# Patient Record
Sex: MALE | Race: OTHER | HISPANIC OR LATINO | URBAN - METROPOLITAN AREA
[De-identification: names, ages, dates, MRNs, and addresses within clinical notes are randomized per-mention and may not be internally consistent; named-entity substitution may affect disease eponyms.]

---

## 2018-12-07 ENCOUNTER — EMERGENCY (EMERGENCY)
Facility: HOSPITAL | Age: 28
LOS: 0 days | Discharge: HOME | End: 2018-12-07
Attending: EMERGENCY MEDICINE | Admitting: EMERGENCY MEDICINE

## 2018-12-07 VITALS
DIASTOLIC BLOOD PRESSURE: 69 MMHG | HEIGHT: 70 IN | TEMPERATURE: 98 F | RESPIRATION RATE: 18 BRPM | HEART RATE: 95 BPM | WEIGHT: 289.91 LBS | OXYGEN SATURATION: 99 % | SYSTOLIC BLOOD PRESSURE: 150 MMHG

## 2018-12-07 VITALS
RESPIRATION RATE: 20 BRPM | HEART RATE: 84 BPM | SYSTOLIC BLOOD PRESSURE: 148 MMHG | OXYGEN SATURATION: 100 % | TEMPERATURE: 97 F | DIASTOLIC BLOOD PRESSURE: 77 MMHG

## 2018-12-07 DIAGNOSIS — M25.561 PAIN IN RIGHT KNEE: ICD-10-CM
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
## 2018-12-07 DIAGNOSIS — Z79.899 OTHER LONG TERM (CURRENT) DRUG THERAPY: ICD-10-CM

## 2018-12-07 DIAGNOSIS — M54.5 LOW BACK PAIN: ICD-10-CM

## 2018-12-07 DIAGNOSIS — S89.91XA UNSPECIFIED INJURY OF RIGHT LOWER LEG, INITIAL ENCOUNTER: ICD-10-CM
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
## 2018-12-07 DIAGNOSIS — R10.33 PERIUMBILICAL PAIN: ICD-10-CM
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
## 2018-12-07 LAB
ALBUMIN SERPL ELPH-MCNC: 4.7 G/DL — SIGNIFICANT CHANGE UP (ref 3.5–5.2)
ALP SERPL-CCNC: 104 U/L — SIGNIFICANT CHANGE UP (ref 30–115)
ALT FLD-CCNC: 36 U/L — SIGNIFICANT CHANGE UP (ref 0–41)
ANION GAP SERPL CALC-SCNC: 16 MMOL/L — HIGH (ref 7–14)
AST SERPL-CCNC: 20 U/L — SIGNIFICANT CHANGE UP (ref 0–41)
BASOPHILS # BLD AUTO: 0.06 K/UL — SIGNIFICANT CHANGE UP (ref 0–0.2)
BASOPHILS NFR BLD AUTO: 0.6 % — SIGNIFICANT CHANGE UP (ref 0–1)
BILIRUB SERPL-MCNC: 0.3 MG/DL — SIGNIFICANT CHANGE UP (ref 0.2–1.2)
BUN SERPL-MCNC: 36 MG/DL — HIGH (ref 10–20)
CALCIUM SERPL-MCNC: 9.7 MG/DL — SIGNIFICANT CHANGE UP (ref 8.5–10.1)
CHLORIDE SERPL-SCNC: 103 MMOL/L — SIGNIFICANT CHANGE UP (ref 98–110)
CO2 SERPL-SCNC: 22 MMOL/L — SIGNIFICANT CHANGE UP (ref 17–32)
CREAT SERPL-MCNC: 3.6 MG/DL — HIGH (ref 0.7–1.5)
EOSINOPHIL # BLD AUTO: 0.11 K/UL — SIGNIFICANT CHANGE UP (ref 0–0.7)
EOSINOPHIL NFR BLD AUTO: 1.1 % — SIGNIFICANT CHANGE UP (ref 0–8)
GLUCOSE SERPL-MCNC: 109 MG/DL — HIGH (ref 70–99)
HCT VFR BLD CALC: 40.4 % — LOW (ref 42–52)
HGB BLD-MCNC: 13.9 G/DL — LOW (ref 14–18)
IMM GRANULOCYTES NFR BLD AUTO: 0.5 % — HIGH (ref 0.1–0.3)
LACTATE SERPL-SCNC: 2.2 MMOL/L — SIGNIFICANT CHANGE UP (ref 0.5–2.2)
LIDOCAIN IGE QN: 36 U/L — SIGNIFICANT CHANGE UP (ref 7–60)
LYMPHOCYTES # BLD AUTO: 2.44 K/UL — SIGNIFICANT CHANGE UP (ref 1.2–3.4)
LYMPHOCYTES # BLD AUTO: 23.7 % — SIGNIFICANT CHANGE UP (ref 20.5–51.1)
MCHC RBC-ENTMCNC: 27.9 PG — SIGNIFICANT CHANGE UP (ref 27–31)
MCHC RBC-ENTMCNC: 34.4 G/DL — SIGNIFICANT CHANGE UP (ref 32–37)
MCV RBC AUTO: 81 FL — SIGNIFICANT CHANGE UP (ref 80–94)
MONOCYTES # BLD AUTO: 0.53 K/UL — SIGNIFICANT CHANGE UP (ref 0.1–0.6)
MONOCYTES NFR BLD AUTO: 5.2 % — SIGNIFICANT CHANGE UP (ref 1.7–9.3)
NEUTROPHILS # BLD AUTO: 7.09 K/UL — HIGH (ref 1.4–6.5)
NEUTROPHILS NFR BLD AUTO: 68.9 % — SIGNIFICANT CHANGE UP (ref 42.2–75.2)
NRBC # BLD: 0 /100 WBCS — SIGNIFICANT CHANGE UP (ref 0–0)
PLATELET # BLD AUTO: 260 K/UL — SIGNIFICANT CHANGE UP (ref 130–400)
POTASSIUM SERPL-MCNC: 4.1 MMOL/L — SIGNIFICANT CHANGE UP (ref 3.5–5)
POTASSIUM SERPL-SCNC: 4.1 MMOL/L — SIGNIFICANT CHANGE UP (ref 3.5–5)
PROT SERPL-MCNC: 8 G/DL — SIGNIFICANT CHANGE UP (ref 6–8)
RBC # BLD: 4.99 M/UL — SIGNIFICANT CHANGE UP (ref 4.7–6.1)
RBC # FLD: 13.3 % — SIGNIFICANT CHANGE UP (ref 11.5–14.5)
SODIUM SERPL-SCNC: 141 MMOL/L — SIGNIFICANT CHANGE UP (ref 135–146)
WBC # BLD: 10.28 K/UL — SIGNIFICANT CHANGE UP (ref 4.8–10.8)
WBC # FLD AUTO: 10.28 K/UL — SIGNIFICANT CHANGE UP (ref 4.8–10.8)

## 2018-12-07 RX ORDER — SODIUM BICARBONATE 1 MEQ/ML
50 SYRINGE (ML) INTRAVENOUS
Qty: 0 | Refills: 0 | COMMUNITY

## 2018-12-07 RX ORDER — AMLODIPINE BESYLATE 2.5 MG/1
1 TABLET ORAL
Qty: 0 | Refills: 0 | COMMUNITY

## 2018-12-07 RX ORDER — LOSARTAN POTASSIUM 100 MG/1
2 TABLET, FILM COATED ORAL
Qty: 0 | Refills: 0 | COMMUNITY

## 2018-12-07 NOTE — ED PROCEDURE NOTE - CPROC ED POST PROC CARE GUIDE1
Keep the cast/splint/dressing clean and dry./Elevate the injured extremity as instructed./Verbal/written post procedure instructions were given to patient/caregiver./Instructed patient/caregiver to follow-up with primary care physician.

## 2018-12-07 NOTE — ED PROVIDER NOTE - CARE PROVIDER_API CALL
Rj Sosa (MD), Orthopaedic Surgery  [facility] [place] [address]  Phone: [phone]  Fax: [phone]

## 2018-12-07 NOTE — ED ADULT NURSE NOTE - NSIMPLEMENTINTERV_GEN_ALL_ED
Implemented All Fall Risk Interventions:  Iron to call system. Call bell, personal items and telephone within reach. Instruct patient to call for assistance. Room bathroom lighting operational. Non-slip footwear when patient is off stretcher. Physically safe environment: no spills, clutter or unnecessary equipment. Stretcher in lowest position, wheels locked, appropriate side rails in place. Provide visual cue, wrist band, yellow gown, etc. Monitor gait and stability. Monitor for mental status changes and reorient to person, place, and time. Review medications for side effects contributing to fall risk. Reinforce activity limits and safety measures with patient and family.

## 2018-12-07 NOTE — ED PROVIDER NOTE - CHPI ED SYMPTOMS NEG
no blood in stool/no dysuria/no diarrhea/no fever/no abdominal distension no chills/no vomiting/no fever

## 2018-12-07 NOTE — ED PROVIDER NOTE - NSFOLLOWUPINSTRUCTIONS_ED_ALL_ED_FT
Knee Sprain    A knee sprain is a tear in the strong bands of tissue that connect the bones (ligaments) of your knee.     HOME CARE  Raise (elevate) your injured knee to lessen puffiness (swelling).  To ease pain and puffiness, put ice on the injured area.  Put ice in a plastic bag.  Place a towel between your skin and the bag.  Leave the ice on for 20 minutes, 2–3 times a day.  Only take medicine as told by your doctor.   Do not leave your knee unprotected until pain and stiffness go away (usually 4–6 weeks).  If you have a cast or splint, do not get it wet. If your doctor told you to not take it off, cover it with a plastic bag when you shower or bathe. Do not swim.  Your doctor may have you do exercises to prevent or limit permanent weakness and stiffness.    GET HELP RIGHT AWAY IF:  Your cast or splint becomes damaged.  Your pain gets worse.  You have a lot of pain, puffiness, or numbness below the cast or splint.    MAKE SURE YOU:  Understand these instructions.   Will watch your condition.  Will get help right away if you are not doing well or get worse.    ADDITIONAL NOTES AND INSTRUCTIONS    Please follow up with your Primary MD in 24-48 hr.  Seek immediate medical care for any new/worsening signs or symptoms.     Document Released: 12/6/2010 Document Revised: 12/23/2014 Document Reviewed: 8/26/2014  [employer] Interactive Patient Education ©2016 [employer] This information is not intended to replace advice given to you by your health care provider. Make sure you discuss any questions you have with your health care provider.    Muscle Strain    A muscle strain (pulled muscle) happens when a muscle is stretched beyond normal length. It happens when a sudden, violent force stretches your muscle too far. Usually, a few of the fibers in your muscle are torn. Muscle strain is common in athletes. Recovery usually takes 1–2 weeks. Complete healing takes 5–6 weeks.     HOME CARE  Follow the PRICE method of treatment to help your injury get better. Do this the first 2–3 days after the injury:  Protect. Protect the muscle to keep it from getting injured again.  Rest. Limit your activity and rest the injured body part.  Ice. Put ice in a plastic bag. Place a towel between your skin and the bag. Then, apply the ice and leave it on from 15–20 minutes each hour. After the third day, switch to moist heat packs.   Compression. Use a splint or elastic bandage on the injured area for comfort. Do not put it on too tightly.  Elevate. Keep the injured body part above the level of your heart.   Only take medicine as told by your doctor.  Warm up before doing exercise to prevent future muscle strains.    GET HELP IF:  You have more pain or puffiness (swelling) in the injured area.  You feel numbness, tingling, or notice a loss of strength in the injured area.     MAKE SURE YOU:  Understand these instructions.   Will watch your condition.  Will get help right away if you are not doing well or get worse.    ADDITIONAL NOTES AND INSTRUCTIONS    Please follow up with your Primary MD in 24-48 hr.  Seek immediate medical care for any new/worsening signs or symptoms.     Document Released: 9/26/2009 Document Revised: 10/8/2014 Document Reviewed: 7/17/2014  [employer] Interactive Patient Education ©2016 [employer] This information is not intended to replace advice given to you by your health care provider. Make sure you discuss any questions you have with your health care provider.

## 2018-12-07 NOTE — ED PROVIDER NOTE - CARE PLAN
Principal Discharge DX:	Periumbilical abdominal pain  Secondary Diagnosis:	Knee injury, right, initial encounter  Secondary Diagnosis:	Back pain 1

## 2018-12-07 NOTE — ED PROVIDER NOTE - OBJECTIVE STATEMENT
Patient c/o abdominal and  right knee pain while lifting heavy object at work today , just PTA No N/V, patient worse with Wt bearing, bending  and movement. No SOB, no chest pain.

## 2018-12-07 NOTE — ED PROVIDER NOTE - ATTENDING CONTRIBUTION TO CARE
Pt with c/o pain to right knee, right LB and abdomen after twisting at work while lifting something heavy.  L:CTAB, Neg mL spine ttp.  +mils right lateral back ttp.  +ttp supraumbilical.  +ttp to right medial knee.  CT with no hernia.  Xray with no fx.  Patient was discharged from the ED. Any available test results were discussed with the patient. Verbal instructions were given, including instructions to return to ED immediately for any new, worsening, or concerning symptoms. Patient endorsed understanding. Written discharge instructions additionally given, including follow-up plan.  Pt aware of need for ortho f/u and will f/u with pmd re abd pain for referral if sx persist. Pt with c/o pain to right knee, right LB and abdomen after twisting at work while lifting something heavy.  L:CTAB, Neg mL spine ttp.  +mild right lateral back ttp.  +ttp supraumbilical.  +ttp to right medial knee.  NL str/sens/rom/gait.  CT with no hernia.  Xray with no fx.  Patient was discharged from the ED. Any available test results were discussed with the patient. Verbal instructions were given, including instructions to return to ED immediately for any new, worsening, or concerning symptoms. Patient endorsed understanding. Written discharge instructions additionally given, including follow-up plan.  Pt aware of need for ortho f/u and will f/u with pmd re abd pain for referral if sx persist.
